# Patient Record
Sex: FEMALE | Race: ASIAN | Employment: OTHER | URBAN - METROPOLITAN AREA
[De-identification: names, ages, dates, MRNs, and addresses within clinical notes are randomized per-mention and may not be internally consistent; named-entity substitution may affect disease eponyms.]

---

## 2020-10-18 ENCOUNTER — HOSPITAL ENCOUNTER (OUTPATIENT)
Facility: CLINIC | Age: 60
Setting detail: OBSERVATION
Discharge: HOME | End: 2020-10-19
Attending: EMERGENCY MEDICINE | Admitting: OTOLARYNGOLOGY
Payer: COMMERCIAL

## 2020-10-18 DIAGNOSIS — R04.0 EPISTAXIS: ICD-10-CM

## 2020-10-18 LAB
ABO + RH BLD: NORMAL
ABO + RH BLD: NORMAL
ANION GAP SERPL CALCULATED.3IONS-SCNC: 4 MMOL/L (ref 3–14)
APTT PPP: 31 SEC (ref 22–37)
BASOPHILS # BLD AUTO: 0 10E9/L (ref 0–0.2)
BASOPHILS NFR BLD AUTO: 0.3 %
BLD GP AB SCN SERPL QL: NORMAL
BLOOD BANK CMNT PATIENT-IMP: NORMAL
BUN SERPL-MCNC: 17 MG/DL (ref 7–30)
CALCIUM SERPL-MCNC: 8.9 MG/DL (ref 8.5–10.1)
CHLORIDE SERPL-SCNC: 106 MMOL/L (ref 94–109)
CO2 SERPL-SCNC: 29 MMOL/L (ref 20–32)
CREAT SERPL-MCNC: 0.75 MG/DL (ref 0.52–1.04)
DIFFERENTIAL METHOD BLD: NORMAL
EOSINOPHIL # BLD AUTO: 0.2 10E9/L (ref 0–0.7)
EOSINOPHIL NFR BLD AUTO: 2.7 %
ERYTHROCYTE [DISTWIDTH] IN BLOOD BY AUTOMATED COUNT: 12.4 % (ref 10–15)
GFR SERPL CREATININE-BSD FRML MDRD: 86 ML/MIN/{1.73_M2}
GLUCOSE SERPL-MCNC: 105 MG/DL (ref 70–99)
HCT VFR BLD AUTO: 41.2 % (ref 35–47)
HGB BLD-MCNC: 13.3 G/DL (ref 11.7–15.7)
IMM GRANULOCYTES # BLD: 0 10E9/L (ref 0–0.4)
IMM GRANULOCYTES NFR BLD: 0.2 %
INR PPP: 1.01 (ref 0.86–1.14)
LYMPHOCYTES # BLD AUTO: 2.8 10E9/L (ref 0.8–5.3)
LYMPHOCYTES NFR BLD AUTO: 32.8 %
MCH RBC QN AUTO: 32.1 PG (ref 26.5–33)
MCHC RBC AUTO-ENTMCNC: 32.3 G/DL (ref 31.5–36.5)
MCV RBC AUTO: 100 FL (ref 78–100)
MONOCYTES # BLD AUTO: 0.5 10E9/L (ref 0–1.3)
MONOCYTES NFR BLD AUTO: 5.7 %
NEUTROPHILS # BLD AUTO: 5 10E9/L (ref 1.6–8.3)
NEUTROPHILS NFR BLD AUTO: 58.3 %
NRBC # BLD AUTO: 0 10*3/UL
NRBC BLD AUTO-RTO: 0 /100
PLATELET # BLD AUTO: 250 10E9/L (ref 150–450)
POTASSIUM SERPL-SCNC: 3.7 MMOL/L (ref 3.4–5.3)
RBC # BLD AUTO: 4.14 10E12/L (ref 3.8–5.2)
SODIUM SERPL-SCNC: 139 MMOL/L (ref 133–144)
SPECIMEN EXP DATE BLD: NORMAL
WBC # BLD AUTO: 8.6 10E9/L (ref 4–11)

## 2020-10-18 PROCEDURE — 86900 BLOOD TYPING SEROLOGIC ABO: CPT | Performed by: EMERGENCY MEDICINE

## 2020-10-18 PROCEDURE — 30903 CONTROL OF NOSEBLEED: CPT | Mod: 50

## 2020-10-18 PROCEDURE — 80048 BASIC METABOLIC PNL TOTAL CA: CPT | Performed by: EMERGENCY MEDICINE

## 2020-10-18 PROCEDURE — 86850 RBC ANTIBODY SCREEN: CPT | Performed by: EMERGENCY MEDICINE

## 2020-10-18 PROCEDURE — 86901 BLOOD TYPING SEROLOGIC RH(D): CPT | Performed by: EMERGENCY MEDICINE

## 2020-10-18 PROCEDURE — 30903 CONTROL OF NOSEBLEED: CPT | Mod: RT

## 2020-10-18 PROCEDURE — 250N000011 HC RX IP 250 OP 636: Performed by: EMERGENCY MEDICINE

## 2020-10-18 PROCEDURE — 85610 PROTHROMBIN TIME: CPT | Performed by: EMERGENCY MEDICINE

## 2020-10-18 PROCEDURE — 99285 EMERGENCY DEPT VISIT HI MDM: CPT | Mod: 25

## 2020-10-18 PROCEDURE — 85730 THROMBOPLASTIN TIME PARTIAL: CPT | Performed by: EMERGENCY MEDICINE

## 2020-10-18 PROCEDURE — 96374 THER/PROPH/DIAG INJ IV PUSH: CPT | Mod: 59

## 2020-10-18 PROCEDURE — 272N000018 HC KIT NASAL PACKING

## 2020-10-18 PROCEDURE — 85025 COMPLETE CBC W/AUTO DIFF WBC: CPT | Performed by: EMERGENCY MEDICINE

## 2020-10-18 PROCEDURE — C9803 HOPD COVID-19 SPEC COLLECT: HCPCS

## 2020-10-18 RX ORDER — OXYMETAZOLINE HYDROCHLORIDE 0.05 G/100ML
SPRAY NASAL
Status: DISCONTINUED
Start: 2020-10-18 | End: 2020-10-19 | Stop reason: HOSPADM

## 2020-10-18 RX ORDER — HYDRALAZINE HYDROCHLORIDE 20 MG/ML
5 INJECTION INTRAMUSCULAR; INTRAVENOUS ONCE
Status: COMPLETED | OUTPATIENT
Start: 2020-10-18 | End: 2020-10-18

## 2020-10-18 RX ORDER — TRANEXAMIC ACID 100 MG/ML
INJECTION, SOLUTION INTRAVENOUS
Status: DISCONTINUED
Start: 2020-10-18 | End: 2020-10-19 | Stop reason: HOSPADM

## 2020-10-18 RX ADMIN — HYDRALAZINE HYDROCHLORIDE 5 MG: 20 INJECTION, SOLUTION INTRAMUSCULAR; INTRAVENOUS at 23:03

## 2020-10-18 ASSESSMENT — ENCOUNTER SYMPTOMS
WEAKNESS: 0
DIZZINESS: 0
LIGHT-HEADEDNESS: 0
BRUISES/BLEEDS EASILY: 0

## 2020-10-18 NOTE — LETTER
[facility] OBSERVATION DEPT  [address]  [facility] [address]  [phone] October 19, 2020    RE:  Thao Castillo                                                                                                                                                       [address] [address]            To whom it may concern:    Thao Castillo was admitted to the hospital on 10/18. She will need to refrain from work for now but is able to return on Monday 10/26. If you have any questions or concerns please feel free to contact us.       Sincerely,        Vannessa Lynn PA-C

## 2020-10-19 VITALS
SYSTOLIC BLOOD PRESSURE: 101 MMHG | HEART RATE: 89 BPM | TEMPERATURE: 98.2 F | OXYGEN SATURATION: 98 % | DIASTOLIC BLOOD PRESSURE: 65 MMHG | RESPIRATION RATE: 12 BRPM

## 2020-10-19 PROBLEM — R04.0 EPISTAXIS: Status: ACTIVE | Noted: 2020-10-19

## 2020-10-19 LAB
SARS-COV-2 RNA SPEC QL NAA+PROBE: NOT DETECTED
SPECIMEN SOURCE: NORMAL

## 2020-10-19 PROCEDURE — 99219 PR INITIAL OBSERVATION CARE,LEVEL II: CPT | Performed by: INTERNAL MEDICINE

## 2020-10-19 PROCEDURE — 30903 CONTROL OF NOSEBLEED: CPT | Mod: RT

## 2020-10-19 PROCEDURE — G0378 HOSPITAL OBSERVATION PER HR: HCPCS

## 2020-10-19 PROCEDURE — 250N000013 HC RX MED GY IP 250 OP 250 PS 637: Performed by: PHYSICIAN ASSISTANT

## 2020-10-19 PROCEDURE — 250N000013 HC RX MED GY IP 250 OP 250 PS 637: Performed by: INTERNAL MEDICINE

## 2020-10-19 PROCEDURE — U0003 INFECTIOUS AGENT DETECTION BY NUCLEIC ACID (DNA OR RNA); SEVERE ACUTE RESPIRATORY SYNDROME CORONAVIRUS 2 (SARS-COV-2) (CORONAVIRUS DISEASE [COVID-19]), AMPLIFIED PROBE TECHNIQUE, MAKING USE OF HIGH THROUGHPUT TECHNOLOGIES AS DESCRIBED BY CMS-2020-01-R: HCPCS | Performed by: EMERGENCY MEDICINE

## 2020-10-19 RX ORDER — ACETAMINOPHEN 325 MG/1
650 TABLET ORAL EVERY 4 HOURS PRN
Status: DISCONTINUED | OUTPATIENT
Start: 2020-10-19 | End: 2020-10-19 | Stop reason: HOSPADM

## 2020-10-19 RX ORDER — AMOXICILLIN 250 MG
2 CAPSULE ORAL 2 TIMES DAILY PRN
Status: DISCONTINUED | OUTPATIENT
Start: 2020-10-19 | End: 2020-10-19 | Stop reason: HOSPADM

## 2020-10-19 RX ORDER — METOPROLOL SUCCINATE 25 MG/1
25 TABLET, EXTENDED RELEASE ORAL DAILY
COMMUNITY

## 2020-10-19 RX ORDER — CEPHALEXIN 500 MG/1
500 CAPSULE ORAL EVERY 8 HOURS SCHEDULED
Status: DISCONTINUED | OUTPATIENT
Start: 2020-10-19 | End: 2020-10-19 | Stop reason: HOSPADM

## 2020-10-19 RX ORDER — ACETAMINOPHEN 650 MG/1
650 SUPPOSITORY RECTAL EVERY 4 HOURS PRN
Status: DISCONTINUED | OUTPATIENT
Start: 2020-10-19 | End: 2020-10-19 | Stop reason: HOSPADM

## 2020-10-19 RX ORDER — ONDANSETRON 4 MG/1
4 TABLET, ORALLY DISINTEGRATING ORAL EVERY 6 HOURS PRN
Status: DISCONTINUED | OUTPATIENT
Start: 2020-10-19 | End: 2020-10-19 | Stop reason: HOSPADM

## 2020-10-19 RX ORDER — IBUPROFEN 200 MG
1 CAPSULE ORAL 2 TIMES DAILY
COMMUNITY

## 2020-10-19 RX ORDER — BISACODYL 10 MG
10 SUPPOSITORY, RECTAL RECTAL DAILY PRN
Status: DISCONTINUED | OUTPATIENT
Start: 2020-10-19 | End: 2020-10-19 | Stop reason: HOSPADM

## 2020-10-19 RX ORDER — POLYETHYLENE GLYCOL 3350 17 G/17G
17 POWDER, FOR SOLUTION ORAL DAILY PRN
Status: DISCONTINUED | OUTPATIENT
Start: 2020-10-19 | End: 2020-10-19 | Stop reason: HOSPADM

## 2020-10-19 RX ORDER — LANOLIN ALCOHOL/MO/W.PET/CERES
3 CREAM (GRAM) TOPICAL
Status: DISCONTINUED | OUTPATIENT
Start: 2020-10-19 | End: 2020-10-19 | Stop reason: HOSPADM

## 2020-10-19 RX ORDER — OXYMETAZOLINE HYDROCHLORIDE 0.05 G/100ML
2 SPRAY NASAL 2 TIMES DAILY
Status: DISCONTINUED | OUTPATIENT
Start: 2020-10-19 | End: 2020-10-19 | Stop reason: HOSPADM

## 2020-10-19 RX ORDER — NALOXONE HYDROCHLORIDE 0.4 MG/ML
.1-.4 INJECTION, SOLUTION INTRAMUSCULAR; INTRAVENOUS; SUBCUTANEOUS
Status: DISCONTINUED | OUTPATIENT
Start: 2020-10-19 | End: 2020-10-19 | Stop reason: HOSPADM

## 2020-10-19 RX ORDER — ONDANSETRON 2 MG/ML
4 INJECTION INTRAMUSCULAR; INTRAVENOUS EVERY 6 HOURS PRN
Status: DISCONTINUED | OUTPATIENT
Start: 2020-10-19 | End: 2020-10-19 | Stop reason: HOSPADM

## 2020-10-19 RX ORDER — AMOXICILLIN 250 MG
1 CAPSULE ORAL 2 TIMES DAILY PRN
Status: DISCONTINUED | OUTPATIENT
Start: 2020-10-19 | End: 2020-10-19 | Stop reason: HOSPADM

## 2020-10-19 RX ADMIN — CEPHALEXIN 500 MG: 500 CAPSULE ORAL at 10:06

## 2020-10-19 RX ADMIN — ACETAMINOPHEN 650 MG: 325 TABLET, FILM COATED ORAL at 04:54

## 2020-10-19 SDOH — HEALTH STABILITY: MENTAL HEALTH: HOW MANY STANDARD DRINKS CONTAINING ALCOHOL DO YOU HAVE ON A TYPICAL DAY?: NOT ASKED

## 2020-10-19 SDOH — HEALTH STABILITY: MENTAL HEALTH: HOW OFTEN DO YOU HAVE A DRINK CONTAINING ALCOHOL?: NEVER

## 2020-10-19 SDOH — HEALTH STABILITY: MENTAL HEALTH: HOW OFTEN DO YOU HAVE 6 OR MORE DRINKS ON ONE OCCASION?: NEVER

## 2020-10-19 NOTE — PROGRESS NOTES
ROOM # 232    Living Situation: home    Activity level at baseline: independent  Activity level on admit: SBA    Is patient a falls risk? Yes  Reason for falls risk: Other- episode of hypotesion, acute blood loss in ED  Falls armband on? YES  Within Arm's Reach? Yes   Bed alarm turned on?   YES  Personal alarm in place and turned on?   No    Patient given Patient Bill of Rights; given the opportunity to ask questions about status and their plan of care.  Patient has been oriented to the room, bathroom and call light is in place

## 2020-10-19 NOTE — PHARMACY-ADMISSION MEDICATION HISTORY
Admission medication history interview status for this patient is complete. See [facility] admission navigator for allergy information, prior to admission medications and immunization status.     Medication history interview source(s):Patient via phone  Medication history resources (including written lists, pill bottles, clinic record):None  Primary pharmacy:[facility], [phone]    Changes made to PTA medication list:  Added: b complex, calcium, metoprolol  Deleted: ---  Changed: ---    Actions taken by pharmacist (provider contacted, etc):phoned [facility] for metoprolol rx     Additional medication history information:they last filled rx in April.  Pt admits to not taking metoprolol regularily    Medication reconciliation/reorder completed by provider prior to medication history? No, sticky note left for MD      For patients on insulin therapy:N    Prior to Admission medications    Medication Sig Last Dose Taking? Auth Provider   calcium carbonate 500 mg, elemental, (OSCAL 500) 1250 (500 Ca) MG TABS tablet Take 1 tablet by mouth 2 times daily 10/17/2020 Yes Unknown, Entered By History   metoprolol succinate ER (TOPROL-XL) 25 MG 24 hr tablet Take 25 mg by mouth daily 10/18/2020 at Unknown time Yes Unknown, Entered By History   vitamin B complex with vitamin C (VITAMIN  B COMPLEX) tablet Take 1 tablet by mouth daily 10/17/2020 Yes Unknown, Entered By History

## 2020-10-19 NOTE — ED NOTES
Bed: ED27  Expected date: 10/18/20  Expected time: 9:48 PM  Means of arrival: Ambulance  Comments:  Yanelis 518- nosebleed

## 2020-10-19 NOTE — DISCHARGE INSTRUCTIONS
Your hospital follow up appointment has been schedule for you with April Mg at [facility] for Monday November 2nd at 8:30 AM. Please bring your hospital discharge instructions, a photo ID, insurance information and any new medications with you to your appointment. Please call the clinic at [phone] if you need to reschedule. The clinic will be emailing you information to get set up with a My Chart account.       Dr. Storey with ENT would like you to be seen in a clinic by Friday. Please call the [place] office at [phone] or [place] Office at [phone]. You are able to go to either clinic.

## 2020-10-19 NOTE — CONSULTS
A/p:   Patient with a right anterior septal bleeding pyogenic granuloma.  The granuloma was cauterized but would benefit from 4-5 days of anterior packing.  If no further bleeding this afternoon the patent can be discharged this evening.  -  Discharge the patient when bleeding has stopped. ENT will see this evening if further bleeding  -Send home on Keflex for 5 days  -Saline in each nostril tid for 7 days  - follow-up Friday with ENT special care for packing removal    [place] presents to the hospital with right-sided epistaxis.  ENT was consulted as bilateral anterior packs did not resolve the epistaxis completely.  He patient presents with hypertension as well.  She denies a past history of this.  She has no other ENT complaints today.      Past medical history, past surgical history, social history, allergies, medications reviewed in epic    Exam:  /65 (BP Location: Left arm)   Pulse 89   Temp 98.2  F (36.8  C) (Oral)   Resp 12   SpO2 98%      patient had bilateral anterior posterior packs that were almost extruded after each nasal passage.  These were removed as a likely warrant doing much.  A medially upon removal of the right anterior pack there was bleeding from the left anterior septum from what appeared to be a pyogenic granuloma.  Gauze was packed in the right anterior nasal passage to the stop the bleeding.  The left pack was removed without any bleeding.  Lidocaine and Afrin soaked cotton balls were then placed in the right nasal passage and the left nasal passage.  After adequate anesthesia time the pyogenic granuloma in the right anterior septum was cauterized multiple times.  Given that the pyogenic granuloma continued to bleed a Merocel pack was placed and secured to the cheek via is Jelena.  The left nasal passage was completely evaluated and blood clots removed with no bleeding.  After the procedure no further bleeding was present the oropharynx.
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
## 2020-10-19 NOTE — ED TRIAGE NOTES
Pt BIBA for C/o nosebleed. Had nosebleed at noon that resolved after 30 min. The second nosebleed started around 2030 during a shower and hasn't stopped since. Pt is not on any blood thinners and no reported trauma. Pt A/Ox4, VSS. 100% on RA. EMS reports using clamp for 30 minutes but it is still bleeding on arrival.

## 2020-10-19 NOTE — CONSULTS
Care Management Assessment and Discharge Consult    General Information  Assessment completed with:: Patient, Family,    Type of CM/SW Visit: Offer D/C Planning  Primary Care Provider verified and updated as needed?: Yes           Advance Care Planning:     NA       Communication Assessment  Patient's communication style: spoken language (English or Bilingual)  Hearing Difficulty or Deaf: no Wear Glasses or Blind: yes    Cognitive  Cognitive/Neuro/Behavioral: WDL                         Financial/Environmental Concerns: insurance, none  Referral to Financial Counselor: Yes  Financial counselor's assisting with MA application    Lifestyle & Psychosocial Needs:        Socioeconomic History     Marital status:      Spouse name: Not on file     Number of children: Not on file     Years of education: Not on file     Highest education level: Not on file     Tobacco Use     Smoking status: Never Smoker   Substance and Sexual Activity     Alcohol use: Never     Frequency: Never     Binge frequency: Never     Drug use: Never       Mental Health Status:  WDL             Discharge Planning:  Expected Discharge Date: 10/19/20     Concerns to be Addressed:    No PCP/ Clinic     Anticipated Discharge Disposition:  Home  Anticipated Discharge Services:  None  Anticipated Discharge DME:  None    Referrals Placed by CM/SW:  Financial counselors following for insurance  Education Provided on the Discharge Plan:  yes  Patient/Family in Agreement with the Plan:  Yes     Disposition Comments:      Selected Continued Care - Admitted Since 10/18/2020    No services have been selected for the patient.         Additional Information:  Met with patient and family at bedside. Patient confirmed that is not currently established in clinic or with a PCP. Patient prefers [facility] in [place] and a female provider.   Appointment scheduled and updated to AVS.   November 2nd at 0830  [facility]  [phone]    Patient signed  completed MA application. Application sent to financial counselors for further review and completion of process.           COURTNEY Hyde MA/RN Case Manager  Inpatient Care Coordination  [facility]   [phone]
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
## 2020-10-19 NOTE — ED NOTES
[facility]  ED Nurse Handoff Report    Thao Catsillo is a 60 year old female   ED Chief complaint: Epistaxis  . ED Diagnosis:   Final diagnoses:   Epistaxis     Allergies: No Known Allergies    Code Status: Full Code  Activity level - Baseline/Home:  Independent. Activity Level - Current:   Independent. Lift room needed: No. Bariatric: No   Needed: No   Isolation: No. Infection: Not Applicable.     Vital Signs:   Vitals:    10/18/20 2350 10/19/20 0000 10/19/20 0010 10/19/20 0025   BP: 130/72 127/74 130/78 129/84   Pulse: 90 87 85 94   Resp: 9 14 11 17   Temp:       TempSrc:       SpO2: 100% 100% 100% 100%       Cardiac Rhythm:  ,      Pain level:    Patient confused: No. Patient Falls Risk: Yes.   Elimination Status: Has voided   Patient Report - Initial Complaint:    Pt BIBA for C/o nosebleed. Had nosebleed at noon that resolved after 30 min. The second nosebleed started around 2030 during a shower and hasn't stopped since. Pt is not on any blood thinners and no reported trauma. Pt A/Ox4, VSS. 100% on RA. EMS reports using clamp for 30 minutes but it is still bleeding on arrival @2208.     . Focused Assessment: Pt hypertensive upon arrival, received hydralyzine 5mg IV @2303, pt BP dropped and pt received 2L of NS with pressure bag. Pt BP improved and is vitally stable. Epitaxis controlled.  Tests Performed: labs. Abnormal Results: n/a  Treatments provided: hydralyzine   Family Comments: family at bedside  OBS brochure/video discussed/provided to patient:  Yes  ED Medications:   Medications   tranexamic acid (CYKLOKAPRON) 1000 MG/10ML injection (has no administration in time range)   oxymetazoline (AFRIN) 0.05 % spray (has no administration in time range)   hydrALAZINE (APRESOLINE) injection 5 mg (5 mg Intravenous Given 10/18/20 2303)     Drips infusing:  No  For the majority of the shift, the patient's behavior Green. Interventions performed were see MAR.    Sepsis treatment initiated:  No     Patient tested for COVID 19 prior to admission: YES    ED Nurse Name/Phone Number: Lesia Lees RN,   11:40 PM    RECEIVING UNIT ED HANDOFF REVIEW    Above ED Nurse Handoff Report was reviewed: Yes  Reviewed by: Korin Donnelly RN on October 19, 2020 at 1:27 AM

## 2020-10-19 NOTE — ED PROVIDER NOTES
History     Chief Complaint:  Epistaxis     The history is provided by the patient and a relative.     Thao Castillo is a 60 year old year old female with a history of hypertension who presents via EMS for evaluation of epistaxis. Yesterday, the patient reports sneezing quite a few times. Today, about 10 hours prior to arrival, the patient developed a right sided nose bleed. It persisted for 40 minutes then resolved on its own. Tonight, about 2 hours prior to arrival, the patient showered and then developed another right sided nose bleed that has not stopped or let up in severity. She denies any use of blood thinners, or a history of nose bleeds or bleeding problems.  She is supposed to take metoprolol for blood pressure, though is intermittently compliant with this.     Allergies:  No Known Allergies    Medications:   Metoprolol     Medical History:   Hypertension     Surgical History   The patient does not have any pertinent past surgical history.    Family History:   No past pertinent family history.    Social History:  Smoking status: Negative  Alcohol use: Negative  Drug use: Negative   Marital Status: Unknown  Presents to the ED via EMS.     Review of Systems   HENT: Positive for nosebleeds.    Neurological: Negative for dizziness, weakness and light-headedness.   Hematological: Does not bruise/bleed easily.   All other systems reviewed and are negative.    Physical Exam     Patient Vitals for the past 24 hrs:   BP Temp Temp src Pulse Resp SpO2   10/19/20 0010 130/78 -- -- 85 11 100 %   10/19/20 0000 127/74 -- -- 87 14 100 %   10/18/20 2350 130/72 -- -- 90 9 100 %   10/18/20 2345 122/79 -- -- 94 27 100 %   10/18/20 2335 136/71 -- -- 93 16 99 %   10/18/20 2330 132/84 -- -- 91 19 98 %   10/18/20 2325 116/67 -- -- 75 22 100 %   10/18/20 2320 (!) 83/55 -- -- 68 -- 100 %   10/18/20 2315 (!) 80/44 -- -- 92 -- 100 %   10/18/20 2301 (!) 151/99 -- -- -- -- 96 %   10/18/20 2217 (!) 163/114 98  F (36.7  C) Oral 89 20  Telephone Encounter by Arleth Barrera RN at 03/21/18 11:33 AM     Author:  Arleth Barrera RN Service:  (none) Author Type:  Registered Nurse     Filed:  03/21/18 11:33 AM Encounter Date:  3/21/2018 Status:  Signed     :  Arleth Barrera RN (Registered Nurse)            to Dr Oakes Head as alessandro[[email or web address]]        Revision History        User Key Date/Time User Provider Type Action    > KM1.1 03/21/18 11:33 AM Arleth Barrera RN Registered Nurse Sign    M - Manual 100 %       Physical Exam  General:   Well-nourished   Speaking in full sentences   Appears uncomfortable  Eyes:   Conjunctiva without injection or scleral icterus   PERRL   EOM full w/out entrapment or proptosis  ENT:   Moist mucous membranes   Posterior oropharynx clear without erythema or exudate   No tonsillar hypertrophy, exudate, asymmetry, nor uvular deviation   No oral lesions   Bilateral TM translucent and gray without air/fluid level or overlying erythema, bony landmarks visualized.   Nares patent   Pinnae normal   No midface swelling, erythema, or asymmetry  Neck:   Full ROM   No stiffness appreciated  Resp:   Even, non-labored respirations  CV:    RRR  Skin:   Warm, dry, well perfused   No rashes or open wounds on exposed skin  MSK:   Moves all extremities   No focal deformities or swelling  Neuro:   Alert   Answers questions appropriately   Moves all extremities equally   Gait stable  Psych:   Normal affect, normal mood    Emergency Department Course   Laboratory:  Laboratory findings were communicated with the patient and family who voiced understanding of the findings.    CBC: AWNL; WBC: 8.6, HGB: 13.3, PLT: 250  BMP: Glucose: 105 (H), o/w WNL (Creatinine: 0.75)    INR: 1.01  PTT: 31    ABO/Rh Type and Screen: O Positive     Asymptomatic COVID-19: Pending    Procedures  Procedure - Anterior Nasal Packing  Performed by:  Rex Jimenez MD  PROCEDURE NOTE: The patient's right nare was prepped with topical tranexamic acid.  The patient's epistaxis could not be stopped with pressure and I was unable to visualize a single site of active bleeding to cauterize.  I therefore packed the nose with a 7.5cm Rapid Rhino and inflated it with 3 cc of air.  Bleeding improved.  PATIENT STATUS: Patient tolerated the procedure well, bleeding improved though did not resolve    Procedure - Anterior Nasal Packing  Performed by:  Rex Jimenez MD  PROCEDURE NOTE: The patient's right nare was prepped with topical tranexamic  acid.  The patient's epistaxis could not be stopped with pressure and I was unable to visualize a single site of active bleeding to cauterize.  I therefore packed the nose with a 5.5cm Rapid Rhino and inflated it with 3 cc of air.  Bleeding improved.  PATIENT STATUS: Patient tolerated the procedure well, bleeding improved though did not resolve  Interventions:  2303 Hydralazine 5 mg IV    Emergency Department Course:  Past medical records, nursing notes, and vitals reviewed.    10:18 PM I performed an exam of the patient as documented above.     IV was inserted and blood was drawn for laboratory testing, results above.    2247 I consulted with Dr. Plunkett, ENT, regarding the patient's history and presentation here in the emergency department. They advised to make sure the patient's pressures remain stable while here in the ED.    2252 I rechecked the patient and discussed the results of her workup thus far.     2320 I rechecked the patient and discussed the results of her workup thus far. After Hydralazine was administered, the patient's blood pressure dropped and she became dizzy.     2337 I rechecked the patient and discussed the results of her workup thus far.     2359 I rechecked the patient and discussed the results of her workup thus far.     0006 Findings and plan explained to the Patient and family who consents to admission. Discussed the patient with Dr. Tong, who will admit the patient to an [facility] bed for further monitoring, evaluation, and treatment.    0040 I rechecked the patient and discussed the results of her workup thus far. Patient is doing well.    I personally reviewed the laboratory results with the Patient and family and answered all related questions prior to admission.     Impression & Plan     Covid-19  Thao Castillo was evaluated during a global COVID-19 pandemic, which necessitated consideration that the patient might be at risk for infection with the SARS-CoV-2 virus that causes  COVID-19.   Applicable protocols for evaluation were followed during the patient's care.   COVID-19 was considered as part of the patient's evaluation. The plan for testing is:  a test was obtained during this visit.    Medical Decision Making:  Thao Castillo is a 60 year old female who presents today for evaluation of epistaxis. VS on presentation reveal elevated BP to 163/114, though otherwise are unremarkable.  Examination notable for marked epistaxis, primarily coming from the right nare.  Bleeding was addressed as noted above, including topical TXA, followed by anterior packing bilaterally.  Rhino Rocket's were unable to be fully seated deeply into the posterior nasal cavity, though following the above interventions, the bleeding notably improved and ultimately essentially resolved aside from a mild trickle.  Given her elevated BP, she was provided 1 dose of hydralazine.  This did result in a transient drop in blood pressure, which quickly normalized.  Patient experienced dizziness/lightheaded with this, though this to subsided shortly thereafter.  She was without any other symptoms such as chest pain, chest pressure, shortness of breath, visual loss, or other neurologic symptoms.  She was reevaluated multiple times.  Her bleeding is significantly improved, though does have mild oozing.  Case was discussed with ENT.  We will plan for hospital observation, and ENT noted they will see the patient in consultation while in the hospital.  Labs reveal unremarkable CBC, normal platelet count, and normal coagulation panel.  Case was discussed with the hospital service who has accepted the patient for admission.  Although the Rhino Rocket's are not completely seated to the posterior nasopharynx, given the degree of bleeding, present degree of control, will leave as currently placed with hopes of providing further tamponade on culprit vessels involved.  Patient and family member were updated multiple times and are in  agreement with proposed plan of care.    Diagnosis:    ICD-10-CM    1. Epistaxis  R04.0        Disposition:  Admitted to Dr. Togn.    Scribe Disclosure:  I, Kiah Parks, am serving as a scribe on 10/18/2020 at 10:18 PM to personally document services performed by Rex Jimenez MD based on my observations and the provider's statements to me.      Rex Jimenez MD  10/19/20 0141

## 2020-10-22 ENCOUNTER — OFFICE VISIT (OUTPATIENT)
Dept: URGENT CARE | Facility: URGENT CARE | Age: 60
End: 2020-10-22
Payer: MEDICAID

## 2020-10-22 VITALS
SYSTOLIC BLOOD PRESSURE: 130 MMHG | TEMPERATURE: 96 F | OXYGEN SATURATION: 100 % | DIASTOLIC BLOOD PRESSURE: 76 MMHG | HEART RATE: 68 BPM | WEIGHT: 120.8 LBS

## 2020-10-22 DIAGNOSIS — I10 BENIGN ESSENTIAL HYPERTENSION: Primary | ICD-10-CM

## 2020-10-22 PROCEDURE — 99204 OFFICE O/P NEW MOD 45 MIN: CPT | Performed by: NURSE PRACTITIONER

## 2020-10-22 RX ORDER — LISINOPRIL 2.5 MG/1
2.5 TABLET ORAL DAILY
Qty: 21 TABLET | Refills: 0 | Status: SHIPPED | OUTPATIENT
Start: 2020-10-22 | End: 2020-11-12

## 2020-10-22 NOTE — PROGRESS NOTES
SUBJECTIVE:   Thao Castillo is a 60 year old female presenting with a chief complaint of high blood pressure. She moved from [facility] a short time ago and has not established care with a primary care provider.  She did make an appointment to see someone but cannot get in until .  She was seen last week in the emergency room and admitted for epistaxis and was quite hypertensive at that time.  They discharged her without any blood pressure medications.  She has been monitoring her blood pressure since then with systolic blood pressures running 155-135 and diastolic blood pressures running 78-84.  She presents today with concerns that her blood pressure is still elevated.  She was previously treated with metoprolol XL 25 mg daily but has not taken this regularly in over a year.  She admits she did not believe she had hypertension.  She took 1 of those pills, which was  the day she was discharged from the hospital but has not taken any since.    She has been having frequent headaches over the last 6 months and wonders if this can be attributed to her high blood pressure.    Past Medical History:   Diagnosis Date     Benign essential hypertension      Endometriosis      Current Outpatient Medications   Medication Sig Dispense Refill     lisinopril (ZESTRIL) 2.5 MG tablet Take 1 tablet (2.5 mg) by mouth daily for 21 days 21 tablet 0     calcium carbonate 500 mg, elemental, (OSCAL 500) 1250 (500 Ca) MG TABS tablet Take 1 tablet by mouth 2 times daily       cephALEXin (KEFLEX) 250 MG capsule Take 1 capsule (250 mg) by mouth 3 times daily for 5 days (Patient not taking: Reported on 10/22/2020) 15 capsule 0     metoprolol succinate ER (TOPROL-XL) 25 MG 24 hr tablet Take 25 mg by mouth daily       vitamin B complex with vitamin C (VITAMIN  B COMPLEX) tablet Take 1 tablet by mouth daily       Social History     Tobacco Use     Smoking status: Never Smoker     Smokeless tobacco: Never Used   Substance  Use Topics     Alcohol use: Never     Frequency: Never     Binge frequency: Never     History reviewed. No pertinent family history.     ROS: 10 point ROS neg other than the symptoms noted above in the HPI.     OBJECTIVE  /76   Pulse 68   Temp 96  F (35.6  C) (Temporal)   Wt 54.8 kg (120 lb 12.8 oz)   SpO2 100%       GENERAL APPEARANCE: healthy appearing, alert     EYES: PERRL, EOMI, sclera non-icteric     HENT: oral exam benign, mucus membranes intact, without ulcers or lesions     NECK: no adenopathy or asymmetry, thyroid normal to palpation     RESP: lungs clear to auscultation - no rales, rhonchi or wheezes     CV: regular rates and rhythm, no murmurs, rubs, or gallop     ABDOMEN:  soft, nontender, no HSM or masses and bowel sounds normal     MS: extremities normal- no gross deformities noted; normal muscle tone.     SKIN: no suspicious lesions or rashes     NEURO: Normal strength and tone, mentation intact and speech normal     PSYCH: normal thought process; no significant mood disturbance    Reviewed all records from admission on 1018/20.  Patient had extensive laboratory test completed when she was in the emergency room that included normal renal function.    ASSESSMENT/PLAN:      ICD-10-CM    1. Benign essential hypertension  I10 lisinopril (ZESTRIL) 2.5 MG tablet      Explained to patient that her blood pressure is not significantly high at the time of this visit but she remains quite concerned because it has been regularly elevated at her home.  Given her pulse of 68 would not restart metoprolol at this time but will give her a small dose of lisinopril to take if her blood pressure systolically is elevated.  She will follow-up on November 2 with her new primary care provider.  Follow Up:      Patient Instructions   Check Blood pressure twice a day and record. Before you take your morning medication and early evening.  If top number is <100 do not take medication that day.      Patient Education      Controlling High Blood Pressure  High blood pressure (hypertension) is often called the silent killer. This is because many people who have it, don t know it. High blood pressure can raise your risk of heart attack, stroke, heart disease, and heart failure. Controlling your blood pressure can decrease your risk of these problems. Know your blood pressure and remember to check it regularly. Doing so can save your life.  Blood pressure measurements are given as 2 numbers. Systolic blood pressure is the upper number. This is the pressure when the heart contracts. Diastolic blood pressure is the lower number. This is the pressure when the heart relaxes between beats.  Blood pressure is categorized as normal, elevated, or stage 1 or stage 2 high blood pressure:    Normal blood pressure is systolic of less than 120 and diastolic of less than 80 (120/80)    Elevated blood pressure is systolic of 120 to 129 and diastolic less than 80    Stage 1 high blood pressure is systolic is 130 to 139 or diastolic between 80 to 89    Stage 2 high blood pressure is when systolic is 140 or higher or the diastolic is 90 or higher  Here are some things you can do to help control your blood pressure.    Choose heart-healthy foods    Select low-salt, low-fat foods. Limit sodium intake to 2,400 mg per day or the amount suggested by your healthcare provider.    Limit canned, dried, cured, packaged, and fast foods. These can contain a lot of salt.    Eat 8 to 10 servings of fruits and vegetables every day.    Choose lean meats, fish, or chicken.    Eat whole-grain pasta, brown rice, and beans.    Eat 2 to 3 servings of low-fat or fat-free dairy products.    Ask your doctor about the DASH eating plan. This plan helps reduce blood pressure.    When you go to a restaurant, ask that your meal be prepared with no added salt.    Maintain a healthy weight    Ask your healthcare provider how many calories to eat a day. Then stick to that  number.    Ask your healthcare provider what weight range is healthiest for you. If you are overweight, a weight loss of only 3% to 5% of your body weight can help lower blood pressure. Generally, a good weight loss goal is to lose 10% of your body weight in a year.    Limit snacks and sweets.    Get regular exercise.    Get up and get active    Choose activities you enjoy. Find ones you can do with friends or family. This includes bicycling, dancing, walking, and jogging.    Park farther away from building entrances.    Use stairs instead of the elevator.    When you can, walk or bike instead of driving.    [place] leaves, garden, or do household repairs.    Be active at a moderate to vigorous level of physical activity for at least 40 minutes for a minimum of 3 to 4 days a week.     Manage stress    Make time to relax and enjoy life. Find time to laugh.    Communicate your concerns with your loved ones and your healthcare provider.    Visit with family and friends, and keep up with hobbies.    Limit alcohol and quit smoking    Men should have no more than 2 drinks per day.    Women should have no more than 1 drink per day.    Talk with your healthcare provider about quitting smoking. Smoking significantly increases your risk for heart disease and stroke. Ask your healthcare provider about community smoking cessation programs and other options.    Medicines  If lifestyle changes aren t enough, your healthcare provider may prescribe high blood pressure medicine. Take all medicines as prescribed. If you have any questions about your medicines, ask your healthcare provider before stopping or changing them.  Date Last Reviewed: 4/27/2016 2000-2019 [employer]. [address] [place] [address], [place] [address]. All rights reserved. This information is not intended as a substitute for professional medical care. Always follow your healthcare professional's instructions.               MARCK Ho,  CNP  [facility] Provider

## 2020-10-22 NOTE — PATIENT INSTRUCTIONS
Check Blood pressure twice a day and record. Before you take your morning medication and early evening.  If top number is <100 do not take medication that day.      Patient Education     Controlling High Blood Pressure  High blood pressure (hypertension) is often called the silent killer. This is because many people who have it, don t know it. High blood pressure can raise your risk of heart attack, stroke, heart disease, and heart failure. Controlling your blood pressure can decrease your risk of these problems. Know your blood pressure and remember to check it regularly. Doing so can save your life.  Blood pressure measurements are given as 2 numbers. Systolic blood pressure is the upper number. This is the pressure when the heart contracts. Diastolic blood pressure is the lower number. This is the pressure when the heart relaxes between beats.  Blood pressure is categorized as normal, elevated, or stage 1 or stage 2 high blood pressure:    Normal blood pressure is systolic of less than 120 and diastolic of less than 80 (120/80)    Elevated blood pressure is systolic of 120 to 129 and diastolic less than 80    Stage 1 high blood pressure is systolic is 130 to 139 or diastolic between 80 to 89    Stage 2 high blood pressure is when systolic is 140 or higher or the diastolic is 90 or higher  Here are some things you can do to help control your blood pressure.    Choose heart-healthy foods    Select low-salt, low-fat foods. Limit sodium intake to 2,400 mg per day or the amount suggested by your healthcare provider.    Limit canned, dried, cured, packaged, and fast foods. These can contain a lot of salt.    Eat 8 to 10 servings of fruits and vegetables every day.    Choose lean meats, fish, or chicken.    Eat whole-grain pasta, brown rice, and beans.    Eat 2 to 3 servings of low-fat or fat-free dairy products.    Ask your doctor about the DASH eating plan. This plan helps reduce blood pressure.    When you go to a  restaurant, ask that your meal be prepared with no added salt.    Maintain a healthy weight    Ask your healthcare provider how many calories to eat a day. Then stick to that number.    Ask your healthcare provider what weight range is healthiest for you. If you are overweight, a weight loss of only 3% to 5% of your body weight can help lower blood pressure. Generally, a good weight loss goal is to lose 10% of your body weight in a year.    Limit snacks and sweets.    Get regular exercise.    Get up and get active    Choose activities you enjoy. Find ones you can do with friends or family. This includes bicycling, dancing, walking, and jogging.    Park farther away from building entrances.    Use stairs instead of the elevator.    When you can, walk or bike instead of driving.    [place] leaves, garden, or do household repairs.    Be active at a moderate to vigorous level of physical activity for at least 40 minutes for a minimum of 3 to 4 days a week.     Manage stress    Make time to relax and enjoy life. Find time to laugh.    Communicate your concerns with your loved ones and your healthcare provider.    Visit with family and friends, and keep up with hobbies.    Limit alcohol and quit smoking    Men should have no more than 2 drinks per day.    Women should have no more than 1 drink per day.    Talk with your healthcare provider about quitting smoking. Smoking significantly increases your risk for heart disease and stroke. Ask your healthcare provider about community smoking cessation programs and other options.    Medicines  If lifestyle changes aren t enough, your healthcare provider may prescribe high blood pressure medicine. Take all medicines as prescribed. If you have any questions about your medicines, ask your healthcare provider before stopping or changing them.  Date Last Reviewed: 4/27/2016 2000-2019 [employer]. [address] [facility], [place] [address]. All rights reserved. This  information is not intended as a substitute for professional medical care. Always follow your healthcare professional's instructions.

## 2020-10-23 ENCOUNTER — PATIENT OUTREACH (OUTPATIENT)
Dept: CARE COORDINATION | Facility: CLINIC | Age: 60
End: 2020-10-23

## 2020-10-23 DIAGNOSIS — Z71.89 COUNSELING AND COORDINATION OF CARE: Primary | ICD-10-CM

## 2020-10-23 NOTE — PROGRESS NOTES
Clinic Care Coordination Contact  [facility]/Voicemail       Clinical Data: Care Coordinator Outreach  Voicemail received from Patient stating that she is interested in applying for Medical Assistance, and that she recently needed to pay $400 to see an ENT and that they do not take payment plans.    Outreach attempted x 1.  Left message on patient's voicemail with call back information and requested return call.  Plan:  Care Coordinator will try to reach patient again in 3-5 business days.      Janna Metcalf [facility]  Clinic Care Coordinator  Ph. [phone]  [email or web address]

## 2020-10-23 NOTE — LETTER
[place] CARE COORDINATION  [address] [facility] [address]  November 4, 2020    Thao Castillo  [address] [place] [id number]      Dear [place],    I am a clinic care coordinator who works at [facility]. I recently tried to call and was unable to reach you. Below is a description of clinic care coordination and how I can further assist you.      The clinic care coordination team is made up of a registered nurse,  and community health worker who understand the health care system. The goal of clinic care coordination is to help you manage your health and improve access to the health care system in the most efficient manner. The team can assist you in meeting your health care goals by providing education, coordinating services, strengthening the communication among your providers and supporting you with any resource needs.    Please feel free to contact me at [phone] with any questions or concerns. We are focused on providing you with the highest-quality healthcare experience possible and that all starts with you.     Sincerely,     Janna Metcalf [facility]  Clinic Care Coordinator  Ph. [phone]  [email or web address]

## 2020-11-04 NOTE — PROGRESS NOTES
Clinic Care Coordination Contact  [facility]/Voicemail       Clinical Data: Care Coordinator Outreach  Outreach attempted x 2.  Left message on patient's voicemail with call back information and requested return call.  Plan: Care Coordinator will send care coordination introduction letter with care coordinator contact information and explanation of care coordination services via mail. Care Coordinator will do no further outreaches at this time.      Janna Metcalf [facility]  Clinic Care Coordinator  Ph. [phone]  [email or web address]